# Patient Record
Sex: FEMALE | Race: WHITE | HISPANIC OR LATINO | Employment: PART TIME | URBAN - METROPOLITAN AREA
[De-identification: names, ages, dates, MRNs, and addresses within clinical notes are randomized per-mention and may not be internally consistent; named-entity substitution may affect disease eponyms.]

---

## 2024-08-26 ENCOUNTER — HOSPITAL ENCOUNTER (EMERGENCY)
Facility: CLINIC | Age: 24
Discharge: HOME | End: 2024-08-26
Attending: EMERGENCY MEDICINE | Admitting: EMERGENCY MEDICINE
Payer: COMMERCIAL

## 2024-08-26 VITALS
WEIGHT: 169.75 LBS | HEIGHT: 65 IN | SYSTOLIC BLOOD PRESSURE: 168 MMHG | DIASTOLIC BLOOD PRESSURE: 88 MMHG | TEMPERATURE: 99.5 F | HEART RATE: 112 BPM | RESPIRATION RATE: 16 BRPM | BODY MASS INDEX: 28.28 KG/M2 | OXYGEN SATURATION: 98 %

## 2024-08-26 DIAGNOSIS — L60.0 INGROWN TOENAIL: ICD-10-CM

## 2024-08-26 PROCEDURE — 99283 EMERGENCY DEPT VISIT LOW MDM: CPT | Mod: 25

## 2024-08-26 PROCEDURE — 11750 EXCISION NAIL&NAIL MATRIX: CPT | Mod: T5

## 2024-08-26 ASSESSMENT — ACTIVITIES OF DAILY LIVING (ADL)
ADLS_ACUITY_SCORE: 35
ADLS_ACUITY_SCORE: 35

## 2024-08-27 NOTE — ED PROVIDER NOTES
"  Emergency Department Note      History of Present Illness     Chief Complaint   Toe Pain      HPI   Deysi Triplett is a 24 year old female who presents with right great toe pain. The patient reports having an ingrown to to her right big toe for the last month after the nail fell off. She reports that the pain is making it hard for her to walk. She reports that she has been using a cream on her toe which is why it looks greasy.     Bulgarian interpretor was used for history    Independent Historian   interpretor as detailed above.    Review of External Notes   I reviewed an [facility] clinic office visit from 8/7/2024    Past Medical History     Medical History and Problem List   No past medical history on file.    Medications   No current outpatient medications on file.      Surgical History   No past surgical history on file.    Physical Exam     Patient Vitals for the past 24 hrs:   BP Temp Temp src Pulse Resp SpO2 Height Weight   08/26/24 2022 -- -- -- -- -- -- 1.651 m (5' 5\") 77 kg (169 lb 12.1 oz)   08/26/24 2020 (!) 168/88 99.5  F (37.5  C) Oral 112 16 98 % -- --     Physical Exam  Physical Exam   Nursing note and vitals reviewed.  General: Oriented to person, place, and time. Appears well-developed and well-nourished.   Head: No signs of trauma.   Mouth/Throat: Oropharynx is clear and moist.   Eyes: Conjunctivae are normal. Pupils are equal, round, and reactive to light.   Neck: Normal range of motion. No nuchal rigidity.   Cardiovascular: Normal rate and regular rhythm.    Respiratory: Effort normal and breath sounds normal. No respiratory distress.   Abdominal: Soft. There is no tenderness. There is no guarding.   Musculoskeletal: Normal range of motion. no edema.   Neurological: The patient is alert and oriented to person, place, and time.  PERRLA, EOMI, visual fields intact, strength in upper/lower extremities normal and symmetrical.   Sensation normal. Speech normal  GCS eye subscore is 4. GCS " verbal subscore is 5. GCS motor subscore is 6.   Skin: Skin is warm and dry. No rash noted.  Right great toe is swollen and painful to the touch on the lateral borders of the nailbed consistent with a bilateral ingrown toenail.  Psychiatric: normal mood and affect. behavior is normal.       Diagnostics     Lab Results   Labs Ordered and Resulted from Time of ED Arrival to Time of ED Departure - No data to display    Imaging   No orders to display       Independent Interpretation   None    ED Course      Medications Administered   Medications - No data to display    Procedures    Toenail Excision       Procedure: Toenail Excision    Consent: Verbal    Indication: Ingrown nail    Location: Right First (great) toe    Preparation: Chlorhexidine    Anesthesia: A digital block was performed with Bupivacaine - 0.5% on the affected digit.    Procedure detail: Wedge Excision: A bilateral lateral portion of the nail was elevated, freed, excised, and removed. A portion of the lateral nail fold was excised. Wound was dressed with antibiotic ointment and non-adherent dressing.     Patient status: The patient tolerated the procedure well: Yes. There were no complications.     Discussion of Management   None    ED Course   ED Course as of 08/27/24 0130   Mon Aug 26, 2024   2040 I obtained history and examined the patient as noted above         Additional Documentation  None    Medical Decision Making / Diagnosis         MATTHEW Triplett is a 24 year old female who presents for evaluation of pain in the right great toenail. This is consistent with an ingrown toenail. After excision of the affected toenail, the area is decompressed and all visible infection removed. Dressings applied and will follow up with podiatry for further management. No signs of serious foot infection at this point to warrant hospitalization, blood work, consultation. No signs of necrotizing skin infection or abscess in soft tissues of the foot.  Stable for discharge.      Disposition   The patient was discharged.     Diagnosis     ICD-10-CM    1. Ingrown toenail  L60.0            Discharge Medications   New Prescriptions    No medications on file         Scribe Disclosure:  I, rEic Hendrickson, am serving as a scribe at 8:50 PM on 8/26/2024 to document services personally performed by Buddy Vickers MD based on my observations and the provider's statements to me.        Buddy Vickers MD  08/27/24 0135

## 2024-08-27 NOTE — ED TRIAGE NOTES
Pt arrives via triage presenting with in grown toe nail on right big foot. Pt states her toe nail fell off about a month ago, now having it grow back in-grown. Toe is red and swollen.      Triage Assessment (Adult)       Row Name 08/26/24 2019          Triage Assessment    Airway WDL WDL        Respiratory WDL    Respiratory WDL WDL        Skin Circulation/Temperature WDL    Skin Circulation/Temperature WDL WDL        Cardiac WDL    Cardiac WDL WDL        Peripheral/Neurovascular WDL    Peripheral Neurovascular WDL WDL        Cognitive/Neuro/Behavioral WDL    Cognitive/Neuro/Behavioral WDL WDL

## 2024-10-16 ENCOUNTER — APPOINTMENT (OUTPATIENT)
Dept: ULTRASOUND IMAGING | Facility: CLINIC | Age: 24
End: 2024-10-16
Attending: EMERGENCY MEDICINE
Payer: COMMERCIAL

## 2024-10-16 ENCOUNTER — HOSPITAL ENCOUNTER (EMERGENCY)
Facility: CLINIC | Age: 24
Discharge: HOME | End: 2024-10-16
Attending: EMERGENCY MEDICINE | Admitting: EMERGENCY MEDICINE
Payer: COMMERCIAL

## 2024-10-16 VITALS
HEART RATE: 85 BPM | WEIGHT: 170 LBS | RESPIRATION RATE: 20 BRPM | BODY MASS INDEX: 28.29 KG/M2 | TEMPERATURE: 98.5 F | SYSTOLIC BLOOD PRESSURE: 140 MMHG | OXYGEN SATURATION: 100 % | DIASTOLIC BLOOD PRESSURE: 80 MMHG

## 2024-10-16 DIAGNOSIS — O26.891 ABDOMINAL PAIN DURING PREGNANCY IN FIRST TRIMESTER: ICD-10-CM

## 2024-10-16 DIAGNOSIS — R10.9 ABDOMINAL PAIN DURING PREGNANCY IN FIRST TRIMESTER: ICD-10-CM

## 2024-10-16 DIAGNOSIS — R10.2 SUPRAPUBIC ABDOMINAL PAIN: ICD-10-CM

## 2024-10-16 LAB
ALBUMIN SERPL BCG-MCNC: 4.3 G/DL (ref 3.5–5.2)
ALBUMIN UR-MCNC: NEGATIVE MG/DL
ALP SERPL-CCNC: 111 U/L (ref 40–150)
ALT SERPL W P-5'-P-CCNC: 47 U/L (ref 0–50)
ANION GAP SERPL CALCULATED.3IONS-SCNC: 13 MMOL/L (ref 7–15)
APPEARANCE UR: CLEAR
AST SERPL W P-5'-P-CCNC: 18 U/L (ref 0–45)
BACTERIA #/AREA URNS HPF: ABNORMAL /HPF
BASOPHILS # BLD AUTO: 0.1 10E3/UL (ref 0–0.2)
BASOPHILS NFR BLD AUTO: 0 %
BILIRUB SERPL-MCNC: 0.2 MG/DL
BILIRUB UR QL STRIP: NEGATIVE
BUN SERPL-MCNC: 12 MG/DL (ref 6–20)
CALCIUM SERPL-MCNC: 9.3 MG/DL (ref 8.8–10.4)
CHLORIDE SERPL-SCNC: 99 MMOL/L (ref 98–107)
CLUE CELLS: ABNORMAL
COLOR UR AUTO: ABNORMAL
CREAT SERPL-MCNC: 0.56 MG/DL (ref 0.51–0.95)
EGFRCR SERPLBLD CKD-EPI 2021: >90 ML/MIN/1.73M2
EOSINOPHIL # BLD AUTO: 0.3 10E3/UL (ref 0–0.7)
EOSINOPHIL NFR BLD AUTO: 2 %
ERYTHROCYTE [DISTWIDTH] IN BLOOD BY AUTOMATED COUNT: 12.2 % (ref 10–15)
GLUCOSE SERPL-MCNC: 80 MG/DL (ref 70–99)
GLUCOSE UR STRIP-MCNC: NEGATIVE MG/DL
HCG INTACT+B SERPL-ACNC: 5849 MIU/ML
HCO3 SERPL-SCNC: 22 MMOL/L (ref 22–29)
HCT VFR BLD AUTO: 41.3 % (ref 35–47)
HGB BLD-MCNC: 14.2 G/DL (ref 11.7–15.7)
HGB UR QL STRIP: NEGATIVE
IMM GRANULOCYTES # BLD: 0.1 10E3/UL
IMM GRANULOCYTES NFR BLD: 1 %
KETONES UR STRIP-MCNC: NEGATIVE MG/DL
LEUKOCYTE ESTERASE UR QL STRIP: NEGATIVE
LYMPHOCYTES # BLD AUTO: 3.3 10E3/UL (ref 0.8–5.3)
LYMPHOCYTES NFR BLD AUTO: 21 %
MCH RBC QN AUTO: 29.3 PG (ref 26.5–33)
MCHC RBC AUTO-ENTMCNC: 34.4 G/DL (ref 31.5–36.5)
MCV RBC AUTO: 85 FL (ref 78–100)
MONOCYTES # BLD AUTO: 0.9 10E3/UL (ref 0–1.3)
MONOCYTES NFR BLD AUTO: 6 %
NEUTROPHILS # BLD AUTO: 10.6 10E3/UL (ref 1.6–8.3)
NEUTROPHILS NFR BLD AUTO: 70 %
NITRATE UR QL: NEGATIVE
NRBC # BLD AUTO: 0 10E3/UL
NRBC BLD AUTO-RTO: 0 /100
PH UR STRIP: 6 [PH] (ref 5–7)
PLATELET # BLD AUTO: 310 10E3/UL (ref 150–450)
POTASSIUM SERPL-SCNC: 3.5 MMOL/L (ref 3.4–5.3)
PROT SERPL-MCNC: 7.7 G/DL (ref 6.4–8.3)
RBC # BLD AUTO: 4.84 10E6/UL (ref 3.8–5.2)
RBC URINE: 1 /HPF
SODIUM SERPL-SCNC: 134 MMOL/L (ref 135–145)
SP GR UR STRIP: 1.01 (ref 1–1.03)
SQUAMOUS EPITHELIAL: 1 /HPF
TRICHOMONAS, WET PREP: ABNORMAL
UROBILINOGEN UR STRIP-MCNC: NORMAL MG/DL
WBC # BLD AUTO: 15.2 10E3/UL (ref 4–11)
WBC URINE: <1 /HPF
WBC'S/HIGH POWER FIELD, WET PREP: ABNORMAL
YEAST, WET PREP: ABNORMAL

## 2024-10-16 PROCEDURE — 36415 COLL VENOUS BLD VENIPUNCTURE: CPT | Performed by: EMERGENCY MEDICINE

## 2024-10-16 PROCEDURE — 99284 EMERGENCY DEPT VISIT MOD MDM: CPT | Mod: 25

## 2024-10-16 PROCEDURE — 85004 AUTOMATED DIFF WBC COUNT: CPT | Performed by: EMERGENCY MEDICINE

## 2024-10-16 PROCEDURE — 87210 SMEAR WET MOUNT SALINE/INK: CPT | Performed by: EMERGENCY MEDICINE

## 2024-10-16 PROCEDURE — 84702 CHORIONIC GONADOTROPIN TEST: CPT | Performed by: EMERGENCY MEDICINE

## 2024-10-16 PROCEDURE — 76801 OB US < 14 WKS SINGLE FETUS: CPT

## 2024-10-16 PROCEDURE — 82040 ASSAY OF SERUM ALBUMIN: CPT | Performed by: EMERGENCY MEDICINE

## 2024-10-16 PROCEDURE — 81003 URINALYSIS AUTO W/O SCOPE: CPT | Performed by: EMERGENCY MEDICINE

## 2024-10-16 ASSESSMENT — ACTIVITIES OF DAILY LIVING (ADL)
ADLS_ACUITY_SCORE: 35

## 2024-10-16 ASSESSMENT — COLUMBIA-SUICIDE SEVERITY RATING SCALE - C-SSRS
1. IN THE PAST MONTH, HAVE YOU WISHED YOU WERE DEAD OR WISHED YOU COULD GO TO SLEEP AND NOT WAKE UP?: NO
6. HAVE YOU EVER DONE ANYTHING, STARTED TO DO ANYTHING, OR PREPARED TO DO ANYTHING TO END YOUR LIFE?: NO
2. HAVE YOU ACTUALLY HAD ANY THOUGHTS OF KILLING YOURSELF IN THE PAST MONTH?: NO

## 2024-10-16 NOTE — ED TRIAGE NOTES
Pt states she is newly notified of pregnancy approx 6 weeks. Pt. States her abdomen is larger that and is concerned she is having complications. Pt states pain is constant. Vaginal discharge is small amount clear yellowish mucous. Pt has a heart monitor and denies new chest pain/shortness of breath.      Triage Assessment (Adult)       Row Name 10/16/24 8665          Triage Assessment    Airway WDL WDL        Respiratory WDL    Respiratory WDL WDL        Skin Circulation/Temperature WDL    Skin Circulation/Temperature WDL WDL        Cardiac WDL    Cardiac WDL WDL        Peripheral/Neurovascular WDL    Peripheral Neurovascular WDL WDL

## 2024-10-16 NOTE — ED PROVIDER NOTES
Emergency Department Note      History of Present Illness     Chief Complaint   Pelvic Pain and Abdominal Pain      HPI   Deysi Triplett is a 24 year old female estimated 6 weeks GA s/p  section with history of PCOS who presents to the ED for evaluation of pelvic and abdominal pain in pregnancy. A  was used over  iPad. Deysi reports 1 week of persistent and worsening bilateral pelvic pain. At work today, she felt a strong pain and went to the bathroom and noticed mucousy vaginal discharge which is abnormal for her. Denies vaginal bleeding. She took Tylenol and prenatal today.    Her LMP started on  and lasted 5 days. She states she has been experiencing nausea, vomiting, and general discomfort since then. Patient was seen by family medicine for this pregnancy and was told she is about 6 weeks GA, but she feels she is further along. Prior to knowledge of this pregnancy, patient was on metformin and naproxen for PCOS, along with ibuprofen for migraines and omeprazole.    Independent Historian   None    Review of External Notes  None    Past Medical History     Medical History and Problem List   PCOS  Hypertension  Hypothyroidism     Medications   Losartan  Omeprazole  Metformin  Amlodipine    Surgical History    section     Physical Exam     Patient Vitals for the past 24 hrs:   BP Temp Temp src Pulse Resp SpO2 Weight   10/16/24 1652 (!) 140/80 98.5  F (36.9  C) Temporal 85 20 100 % 77.1 kg (170 lb)     Physical Exam  General: Alert, appears well-developed and well-nourished. Cooperative.     In mild distress  HEENT:  Head:  Atraumatic  Ears:  External ears are normal  Mouth/Throat:  Oropharynx is without erythema or exudate and mucous membranes are moist.   Eyes:   Conjunctivae normal and EOM are normal. No scleral icterus.  CV:  Normal rate, regular rhythm, normal heart sounds and radial pulses are 2+ and symmetric.  No murmur.  Resp:  Breath sounds are clear  bilaterally    Non-labored, no retractions or accessory muscle use  GI:  Abdomen is soft, no distension, suprapubic tenderness. No rebound or guarding.  No CVA tenderness bilaterally  MS:  Normal range of motion. No edema.    Normal strength in all 4 extremities.     Back atraumatic.    No midline cervical, thoracic, or lumbar tenderness  Skin:  Warm and dry.  No rash or lesions noted.  Neuro:   Alert. Normal strength.  GCS: 15  Psych: Normal mood and affect.    Diagnostics     Lab Results   Labs Ordered and Resulted from Time of ED Arrival to Time of ED Departure   COMPREHENSIVE METABOLIC PANEL - Abnormal       Result Value    Sodium 134 (*)     Potassium 3.5      Carbon Dioxide (CO2) 22      Anion Gap 13      Urea Nitrogen 12.0      Creatinine 0.56      GFR Estimate >90      Calcium 9.3      Chloride 99      Glucose 80      Alkaline Phosphatase 111      AST 18      ALT 47      Protein Total 7.7      Albumin 4.3      Bilirubin Total 0.2     ROUTINE UA WITH MICROSCOPIC REFLEX TO CULTURE - Abnormal    Color Urine Straw      Appearance Urine Clear      Glucose Urine Negative      Bilirubin Urine Negative      Ketones Urine Negative      Specific Gravity Urine 1.009      Blood Urine Negative      pH Urine 6.0      Protein Albumin Urine Negative      Urobilinogen Urine Normal      Nitrite Urine Negative      Leukocyte Esterase Urine Negative      Bacteria Urine Few (*)     RBC Urine 1      WBC Urine <1      Squamous Epithelials Urine 1     HCG QUANTITATIVE PREGNANCY - Abnormal    hCG Quantitative 5,849 (*)    CBC WITH PLATELETS AND DIFFERENTIAL - Abnormal    WBC Count 15.2 (*)     RBC Count 4.84      Hemoglobin 14.2      Hematocrit 41.3      MCV 85      MCH 29.3      MCHC 34.4      RDW 12.2      Platelet Count 310      % Neutrophils 70      % Lymphocytes 21      % Monocytes 6      % Eosinophils 2      % Basophils 0      % Immature Granulocytes 1      NRBCs per 100 WBC 0      Absolute Neutrophils 10.6 (*)     Absolute  Lymphocytes 3.3      Absolute Monocytes 0.9      Absolute Eosinophils 0.3      Absolute Basophils 0.1      Absolute Immature Granulocytes 0.1      Absolute NRBCs 0.0     WET PREPARATION - Abnormal    Trichomonas Absent      Yeast Absent      Clue Cells Absent      WBCs/high power field 2+ (*)        Imaging   US OB <14 Weeks W Transvaginal   Final Result   IMPRESSION:    1.  Single intrauterine gestation with cardiac activity at 6 weeks 3 days, with EDC of 06/08/2025.                Independent Interpretation   None    ED Course      Medications Administered   Medications - No data to display    Procedures   Procedures     Discussion of Management   None    ED Course   ED Course as of 10/16/24 2257   Wed Oct 16, 2024   1901 I obtained history and examined the patient as noted above.        Additional Documentation  None    Medical Decision Making / Diagnosis     CMS Diagnoses: None    MIPS   None    MDM   Deysi Triplett is a 24 year old female approximately 6 weeks pregnant who presents with lower abdominal discomfort.  Patient is tender in the suprapubic region on physical examination.  She is also been describing some mild vaginal discharge but denies any vaginal bleeding or spotting.  Thankfully lab work relatively unremarkable except for nonspecific leukocytosis of 15.2.  Patient did have a elevated beta hCG at 5849.  Ultrasound is consistent with intrauterine pregnancy noted to be 6 weeks and 3 days based on measurements tonight.  Urinalysis shows no sign of infection.  Self swab wet prep shows no evidence of bacterial vaginosis yeast or trichomonas.  Low suspicion for sinister surgical intra-abdominal pathologies necessitating further advanced imaging such as MRI at this time.  Patient was encouraged to utilize Tylenol for discomfort and warm compresses as needed.  She can follow-up or establish with her OB/GYN in the near future as a suspect pain is consistent with early pregnancy discomfort.  Return  precautions understood for development of vaginal bleeding, worsening abdominal pain, fever or any new concerning symptom onset.  After all questions answered and return precautions understood, discharged home.    Due to language barrier, an  was present during the history-taking and subsequent discussion (and for part of the physical exam) with this patient.    Disposition   The patient was discharged.     Diagnosis     ICD-10-CM    1. Suprapubic abdominal pain  R10.2       2. Abdominal pain during pregnancy in first trimester  O26.891     R10.9            Discharge Medications   New Prescriptions    No medications on file         Scribe Disclosure:  I, Natalya Garibay, am serving as a scribe at 7:17 PM on 10/16/2024 to document services personally performed by Jonnathan Ochoa MD based on my observations and the provider's statements to me.        Jonnathan Ochoa MD  10/16/24 7450

## 2024-11-13 ENCOUNTER — HOSPITAL ENCOUNTER (EMERGENCY)
Facility: CLINIC | Age: 24
Discharge: HOME | End: 2024-11-13
Attending: EMERGENCY MEDICINE | Admitting: EMERGENCY MEDICINE
Payer: COMMERCIAL

## 2024-11-13 VITALS
BODY MASS INDEX: 27.12 KG/M2 | WEIGHT: 163 LBS | RESPIRATION RATE: 18 BRPM | DIASTOLIC BLOOD PRESSURE: 88 MMHG | TEMPERATURE: 98.8 F | HEART RATE: 104 BPM | SYSTOLIC BLOOD PRESSURE: 153 MMHG | OXYGEN SATURATION: 99 %

## 2024-11-13 DIAGNOSIS — R51.9 ACUTE NONINTRACTABLE HEADACHE, UNSPECIFIED HEADACHE TYPE: ICD-10-CM

## 2024-11-13 LAB
ALBUMIN UR-MCNC: NEGATIVE MG/DL
ANION GAP SERPL CALCULATED.3IONS-SCNC: 12 MMOL/L (ref 7–15)
APPEARANCE UR: CLEAR
BASOPHILS # BLD AUTO: 0 10E3/UL (ref 0–0.2)
BASOPHILS NFR BLD AUTO: 0 %
BILIRUB UR QL STRIP: NEGATIVE
BUN SERPL-MCNC: 5.5 MG/DL (ref 6–20)
CALCIUM SERPL-MCNC: 9.3 MG/DL (ref 8.8–10.4)
CHLORIDE SERPL-SCNC: 98 MMOL/L (ref 98–107)
COLOR UR AUTO: ABNORMAL
CREAT SERPL-MCNC: 0.38 MG/DL (ref 0.51–0.95)
EGFRCR SERPLBLD CKD-EPI 2021: >90 ML/MIN/1.73M2
EOSINOPHIL # BLD AUTO: 0.2 10E3/UL (ref 0–0.7)
EOSINOPHIL NFR BLD AUTO: 1 %
ERYTHROCYTE [DISTWIDTH] IN BLOOD BY AUTOMATED COUNT: 11.9 % (ref 10–15)
FLUAV RNA SPEC QL NAA+PROBE: NEGATIVE
FLUBV RNA RESP QL NAA+PROBE: NEGATIVE
GLUCOSE SERPL-MCNC: 80 MG/DL (ref 70–99)
GLUCOSE UR STRIP-MCNC: NEGATIVE MG/DL
HCO3 SERPL-SCNC: 24 MMOL/L (ref 22–29)
HCT VFR BLD AUTO: 41.6 % (ref 35–47)
HGB BLD-MCNC: 14.4 G/DL (ref 11.7–15.7)
HGB UR QL STRIP: ABNORMAL
IMM GRANULOCYTES # BLD: 0.1 10E3/UL
IMM GRANULOCYTES NFR BLD: 0 %
KETONES UR STRIP-MCNC: NEGATIVE MG/DL
LEUKOCYTE ESTERASE UR QL STRIP: NEGATIVE
LYMPHOCYTES # BLD AUTO: 2.8 10E3/UL (ref 0.8–5.3)
LYMPHOCYTES NFR BLD AUTO: 20 %
MCH RBC QN AUTO: 29.2 PG (ref 26.5–33)
MCHC RBC AUTO-ENTMCNC: 34.6 G/DL (ref 31.5–36.5)
MCV RBC AUTO: 84 FL (ref 78–100)
MONOCYTES # BLD AUTO: 1 10E3/UL (ref 0–1.3)
MONOCYTES NFR BLD AUTO: 7 %
NEUTROPHILS # BLD AUTO: 9.8 10E3/UL (ref 1.6–8.3)
NEUTROPHILS NFR BLD AUTO: 71 %
NITRATE UR QL: NEGATIVE
NRBC # BLD AUTO: 0 10E3/UL
NRBC BLD AUTO-RTO: 0 /100
PH UR STRIP: 7 [PH] (ref 5–7)
PLAT MORPH BLD: NORMAL
PLATELET # BLD AUTO: 325 10E3/UL (ref 150–450)
POTASSIUM SERPL-SCNC: 3.9 MMOL/L (ref 3.4–5.3)
RBC # BLD AUTO: 4.93 10E6/UL (ref 3.8–5.2)
RBC MORPH BLD: NORMAL
RBC URINE: 1 /HPF
RSV RNA SPEC NAA+PROBE: NEGATIVE
SARS-COV-2 RNA RESP QL NAA+PROBE: NEGATIVE
SODIUM SERPL-SCNC: 134 MMOL/L (ref 135–145)
SP GR UR STRIP: 1.01 (ref 1–1.03)
SQUAMOUS EPITHELIAL: 1 /HPF
UROBILINOGEN UR STRIP-MCNC: NORMAL MG/DL
WBC # BLD AUTO: 13.8 10E3/UL (ref 4–11)
WBC URINE: 1 /HPF

## 2024-11-13 PROCEDURE — 258N000003 HC RX IP 258 OP 636: Performed by: EMERGENCY MEDICINE

## 2024-11-13 PROCEDURE — 96361 HYDRATE IV INFUSION ADD-ON: CPT

## 2024-11-13 PROCEDURE — 250N000013 HC RX MED GY IP 250 OP 250 PS 637: Performed by: EMERGENCY MEDICINE

## 2024-11-13 PROCEDURE — 80048 BASIC METABOLIC PNL TOTAL CA: CPT | Performed by: EMERGENCY MEDICINE

## 2024-11-13 PROCEDURE — 96360 HYDRATION IV INFUSION INIT: CPT

## 2024-11-13 PROCEDURE — 99283 EMERGENCY DEPT VISIT LOW MDM: CPT

## 2024-11-13 PROCEDURE — 36415 COLL VENOUS BLD VENIPUNCTURE: CPT | Performed by: EMERGENCY MEDICINE

## 2024-11-13 PROCEDURE — 85014 HEMATOCRIT: CPT | Performed by: EMERGENCY MEDICINE

## 2024-11-13 PROCEDURE — 85004 AUTOMATED DIFF WBC COUNT: CPT | Performed by: EMERGENCY MEDICINE

## 2024-11-13 PROCEDURE — 81003 URINALYSIS AUTO W/O SCOPE: CPT | Performed by: EMERGENCY MEDICINE

## 2024-11-13 PROCEDURE — 82435 ASSAY OF BLOOD CHLORIDE: CPT | Performed by: EMERGENCY MEDICINE

## 2024-11-13 PROCEDURE — 87637 SARSCOV2&INF A&B&RSV AMP PRB: CPT | Performed by: EMERGENCY MEDICINE

## 2024-11-13 RX ORDER — ACETAMINOPHEN 500 MG
1000 TABLET ORAL ONCE
Status: COMPLETED | OUTPATIENT
Start: 2024-11-13 | End: 2024-11-13

## 2024-11-13 RX ADMIN — ACETAMINOPHEN 1000 MG: 500 TABLET ORAL at 20:30

## 2024-11-13 RX ADMIN — SODIUM CHLORIDE 1000 ML: 9 INJECTION, SOLUTION INTRAVENOUS at 20:32

## 2024-11-13 ASSESSMENT — ACTIVITIES OF DAILY LIVING (ADL)
ADLS_ACUITY_SCORE: 0

## 2024-11-14 NOTE — DISCHARGE INSTRUCTIONS
Discharge Instructions  Headache    You were seen today for a headache. Headaches may be caused by many different things such as muscle tension, sinus inflammation, anxiety and stress, having too little sleep, too much alcohol, some medical conditions or injury. You may have a migraine, which is caused by changes in the blood vessels in your head.  At this time your provider does not find that your headache is a sign of anything dangerous or life-threatening.  However, sometimes the signs of serious illness do not show up right away.      Generally, every Emergency Department visit should have a follow-up clinic visit with either a primary or a specialty clinic/provider. Please follow-up as instructed by your emergency provider today.    Return to the Emergency Department if:  You get a new fever of 100.4 F or higher.  Your headache gets much worse.  You get a stiff neck with your headache.  You get a new headache that is significantly different or worse than headaches you have had before.  You are vomiting (throwing up) and cannot keep food or water down.  You have blurry or double vision or other problems with your eyes.  You have a new weakness on one side of your body.  You have difficulty with balance which is new.  You or your family thinks you are confused.  You have a seizure.    What can I do to help myself?  Pain medications - You may take a pain medication such as Tylenol  (acetaminophen)  Take a pain reliever as soon as you notice symptoms.  Starting medications as soon as you start to have symptoms may lessen the amount of pain you have.  Relaxing in a quiet, dark room may help.  Get enough sleep and eat meals regularly.  You may need to watch for certain foods or other things which may trigger your headaches.  Keeping a journal of your headaches and possible triggers may help you and your primary provider to identify things which you should avoid which may be causing your headaches.  If you were  given a prescription for medicine here today, be sure to read all of the information (including the package insert) that comes with your prescription.  This will include important information about the medicine, its side effects, and any warnings that you need to know about.  The pharmacist who fills the prescription can provide more information and answer questions you may have about the medicine.  If you have questions or concerns that the pharmacist cannot address, please call or return to the Emergency Department.   Remember that you can always come back to the Emergency Department if you are not able to see your regular provider in the amount of time listed above, if you get any new symptoms, or if there is anything that worries you.

## 2024-11-14 NOTE — ED TRIAGE NOTES
Headache since yesterday; 10 weeks pregnant      Triage Assessment (Adult)       Row Name 11/13/24 1849          Triage Assessment    Airway WDL WDL        Respiratory WDL    Respiratory WDL WDL        Skin Circulation/Temperature WDL    Skin Circulation/Temperature WDL WDL        Cardiac WDL    Cardiac WDL WDL        Peripheral/Neurovascular WDL    Peripheral Neurovascular WDL WDL        Cognitive/Neuro/Behavioral WDL    Cognitive/Neuro/Behavioral WDL X;all        Jean Coma Scale    Best Eye Response 4-->(E4) spontaneous     Best Motor Response 6-->(M6) obeys commands     Best Verbal Response 5-->(V5) oriented     Jean Coma Scale Score 15

## 2024-11-14 NOTE — ED PROVIDER NOTES
Emergency Department Note      History of Present Illness     Chief Complaint   Headache      HPI   Deysi Triplett is a 24 year old female 10 weeks pregnant with a history of hypertension and PCOS who presents to the ED for evaluation of a headache. A Welsh interpretor was used. The patient reports having a severe headache since yesterday, with blood pressure medication and acetaminophen not alleviating the symptoms. She notes her last dose of acetaminophen was around 0900. She endorses chills, frequent urination, abdominal discomfort, lower back pain, and vomiting more than expected for pregnancy. She denies any vaginal bleeding or discharge, fevers, dysuria, or hematuria.     Independent Historian   None    Review of External Notes   Clinic note reviewed from 24.    Past Medical History     Medical History and Problem List   Dyspnea  Hypertension  Palpitations  PCOS    Medications   Aspirin 81 MG  Vibra-tabs  Cozaar  Glucophage-XR  Naprosyn  Prilosec    Surgical History    Section    Physical Exam     Patient Vitals for the past 24 hrs:   BP Temp Temp src Pulse Resp SpO2 Weight   24 1801 (!) 153/88 98.8  F (37.1  C) Temporal 104 18 99 % 73.9 kg (163 lb)     Physical Exam  General: No acute distress  Head: No obvious trauma to head.  Ears, Nose, Throat:  External ears normal.  Nose normal.  No pharyngeal erythema, swelling or exudate.  Midline uvula. Moist mucus membranes.  Eyes:  Conjunctivae clear.   Neck: Normal range of motion.  Neck supple.   CV: Regular rate and rhythm.  No murmurs.      Respiratory: Effort normal and breath sounds normal.  No wheezing or crackles.   Gastrointestinal: Soft.  No distension. There is no tenderness.  There is no rigidity, no rebound and no guarding.   Musculoskeletal: Normal range of motion.  Non tender extremities to palpations. No lower extremity edema  Neuro: Alert. Moving all extremities appropriately.  Normal speech.    Skin: Skin is warm and  dry.  No rash noted.   Psych: Normal mood and affect. Behavior is normal.     Diagnostics     Lab Results   Labs Ordered and Resulted from Time of ED Arrival to Time of ED Departure   BASIC METABOLIC PANEL - Abnormal       Result Value    Sodium 134 (*)     Potassium 3.9      Chloride 98      Carbon Dioxide (CO2) 24      Anion Gap 12      Urea Nitrogen 5.5 (*)     Creatinine 0.38 (*)     GFR Estimate >90      Calcium 9.3      Glucose 80     ROUTINE UA WITH MICROSCOPIC REFLEX TO CULTURE - Abnormal    Color Urine Light Yellow      Appearance Urine Clear      Glucose Urine Negative      Bilirubin Urine Negative      Ketones Urine Negative      Specific Gravity Urine 1.010      Blood Urine Trace (*)     pH Urine 7.0      Protein Albumin Urine Negative      Urobilinogen Urine Normal      Nitrite Urine Negative      Leukocyte Esterase Urine Negative      RBC Urine 1      WBC Urine 1      Squamous Epithelials Urine 1     CBC WITH PLATELETS AND DIFFERENTIAL - Abnormal    WBC Count 13.8 (*)     RBC Count 4.93      Hemoglobin 14.4      Hematocrit 41.6      MCV 84      MCH 29.2      MCHC 34.6      RDW 11.9      Platelet Count 325      % Neutrophils 71      % Lymphocytes 20      % Monocytes 7      % Eosinophils 1      % Basophils 0      % Immature Granulocytes 0      NRBCs per 100 WBC 0      Absolute Neutrophils 9.8 (*)     Absolute Lymphocytes 2.8      Absolute Monocytes 1.0      Absolute Eosinophils 0.2      Absolute Basophils 0.0      Absolute Immature Granulocytes 0.1      Absolute NRBCs 0.0     INFLUENZA A/B, RSV AND SARS-COV2 PCR - Normal    Influenza A PCR Negative      Influenza B PCR Negative      RSV PCR Negative      SARS CoV2 PCR Negative     RBC AND PLATELET MORPHOLOGY    RBC Morphology Confirmed RBC Indices      Platelet Assessment        Value: Automated Count Confirmed. Platelet morphology is normal.       Imaging   No orders to display     Independent Interpretation   None    ED Course      Medications  Administered   Medications   sodium chloride 0.9% BOLUS 1,000 mL (0 mLs Intravenous Stopped 11/13/24 2206)   acetaminophen (TYLENOL) tablet 1,000 mg (1,000 mg Oral $Given 11/13/24 2030)       Procedures   Procedures     Discussion of Management   None    ED Course   ED Course as of 11/14/24 0216 Wed Nov 13, 2024   2152 I rechecked the patient and explained findings. We discussed plans for discharge and the patient is agreeable with this plan.          Additional Documentation  None    Medical Decision Making / Diagnosis     CMS Diagnoses: None    MIPS       None    MDM   Deysi Triplett is a 24 year old female presenting with a headache.  She is pregnant, so this limits the choices of medication to treat her headache.  She is given Tylenol and 1 L normal saline bolus.  She also endorses urinary frequency.  UA is negative for infection.  She has no neck stiffness, and is not encephalopathic.  She does not appear toxic and is afebrile.  I have low concern for meningitis.  Upon reevaluation, she reports her headache is improved after the Tylenol.  She is instructed to continue Tylenol for any headache and to follow-up with her OB/GYN provider.  Return precautions are given and she verbalizes understanding.  She is discharged home in stable condition.    Disposition   The patient was discharged.     Diagnosis     ICD-10-CM    1. Acute nonintractable headache, unspecified headache type  R51.9            Discharge Medications   There are no discharge medications for this patient.        Scribe Disclosure:  I, Edison Madsen, am serving as a scribe at 8:02 PM on 11/13/2024 to document services personally performed by Cayetano Mauricio MD based on my observations and the provider's statements to me.        Cayetano Mauricio MD  11/14/24 3133